# Patient Record
Sex: MALE | Race: BLACK OR AFRICAN AMERICAN | Employment: FULL TIME | URBAN - METROPOLITAN AREA
[De-identification: names, ages, dates, MRNs, and addresses within clinical notes are randomized per-mention and may not be internally consistent; named-entity substitution may affect disease eponyms.]

---

## 2019-08-13 ENCOUNTER — HOSPITAL ENCOUNTER (OUTPATIENT)
Dept: PHYSICAL THERAPY | Age: 54
Discharge: HOME | End: 2019-08-13
Payer: COMMERCIAL

## 2019-08-13 PROCEDURE — 97140 MANUAL THERAPY 1/> REGIONS: CPT | Performed by: PHYSICAL THERAPIST

## 2019-08-13 PROCEDURE — 97110 THERAPEUTIC EXERCISES: CPT | Performed by: PHYSICAL THERAPIST

## 2019-08-13 PROCEDURE — 97162 PT EVAL MOD COMPLEX 30 MIN: CPT | Performed by: PHYSICAL THERAPIST

## 2019-08-13 NOTE — THERAPY EVALUATION
[facility] : 1965 [address] @ [place] [address]. [phone]   [phone] OUTPATIENT PHYSICAL THERAPY:Initial Assessment 2019 ICD-10: Treatment Diagnosis: Radiculopathy, lumbar region (M54.16) Low back pain (M54.5) Difficulty in walking, not elsewhere classified (R26.2) Precautions/Allergies:  
Patient has no allergy information on record. no known Fall Risk Score:  
 Ambulatory/Rehab Services H2 Model Falls Risk Assessment Risk Factors: 
     (1)  Gender [Male] Ability to Rise from Chair: 
     (0)  Ability to rise in a single movement Falls Prevention Plan: No modifications necessary Total: (5 or greater = High Risk): 2  
  [facility]. [facility] [place] Patent #[id number]. Federal Law prohibits the replication, distribution or use without written permission from [facility] [employer] MD Orders: Evaluate and Treat with aquatic therapy: chronic low back pain with right-sided sciatica; lumbar radiculopathy, spinal stenosis of lumbar region with neurogenic claudication MEDICAL/REFERRING DIAGNOSIS: 
Chronic bilateral low back pain with right-sided sciatica [M54.41, G89.29] Right lumbar radiculopathy [M54.16] Spinal stenosis of lumbar region with neurogenic claudication [M48.062] DATE OF ONSET: chronic with worsening over the past year REFERRING PHYSICIAN: McCarrell, Leverette Battiest* RETURN PHYSICIAN APPOINTMENT: 10/8/19 INITIAL ASSESSMENT:  Mr. Sangeetha Dill presents with chronic LBP with pain in R lumbar region and into sacral/perineal region with c/o intermittent burning and numbness. Pt also has history of numbness in L lower lateral leg. No red flags present with bowel/bladder function when pt was asked. Pt is very limited with standing and walking and has been out of work due to inability to tolerate walking into/out of work.  Pt also has difficulty sitting or sleeping prolonged periods. Pt is obese and has decreased trunk and hip rotation mobility with more limitation with R. Pt has antalgic gait and decreased hip strength. Pt will benefit from aquatic PT to decrease load on spine and increase flexibility and core/hip strength to improve functional mobility. PROBLEM LIST (Impacting functional limitations): 1. Decreased Strength 2. Decreased ADL/Functional Activities 3. Decreased Transfer Abilities 4. Decreased Ambulation Ability/Technique 5. Decreased Balance 6. Increased Pain 7. Decreased Activity Tolerance 8. Decreased Flexibility/Joint Mobility 9. Decreased [place] with Home Exercise Program INTERVENTIONS PLANNED: 
1. Balance Exercise 2. Cold 3. Electrical Stimulation 4. Gait Training 5. Heat 6. Home Exercise Program (HEP) 7. Manual Therapy 8. Neuromuscular Re-education/Strengthening 9. Range of Motion (ROM) 10. Therapeutic Activites 11. Therapeutic Exercise/Strengthening 12. Transcutaneous Electrical Nerve Stimulation (TENS) 13. aquatics TREATMENT PLAN: 
Effective Dates: 8/13/2019 TO 11/11/2019 (90 days). Frequency/Duration: 2-3 times a week for 90 Days GOALS: (Goals have been discussed and agreed upon with patient.) Short-Term Functional Goals: Time Frame: 3 weeks 1. Pt will report compliance with HEP. 2. Pt will be able to tolerate at least 40 minutes of aquatic exercises without increased pain. Pt will be able to report being increase hip rotation mobility at least 10 ° for increased ease with tasks such as donning shoes/socks. Discharge Goals: Time Frame: 8-12 weeks 1. Pt will be able to ambulate at least 200 yds without needing to sit down doubling his walking distance from evaluation. 2. Pt will be able to decrease Oswestry score 17/50 or less noting significant functional improvements.   
3. Pt will be able to increase gluteus medius strength 4+/5 or greater for increased tolerance with walking and standing. Rehabilitation Potential For Stated Goals: Good Regarding [facility]'s therapy, I certify that the treatment plan above will be carried out by a therapist or under their direction. Thank you for this referral, Yesy Reyes, PT Referring Physician Signature: Brook Montalvo              Date HISTORY:  
History of Present Injury/Illness (Reason for Referral): 
Pt has history of chronic LBP with recent exacerbation over the past year. Pt reports primary pain in lower R lumbar region but describes pain into buttocks/sacral area/perineal. Pt reports severe pain with walking and standing and about a max of 100 yd walking. 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 Pt's MRI also showed L3-4 bilaterally foraminal stenosis. Pt has had prior lumbar surgeries, first in 1995 and two in 2000 from herniations (and 2nd surgery in 2000 was from infection). Pt is being referred for aquatic PT and desires to regain strength and endurance in his legs to tolerate walking more. Pt does state he is begin referred to a neurosurgeon 8/30/19. Past Medical History/Comorbidities:  
Mr. Mayank Izaguirre  has no past medical history on file. Mr. Mayank Izaguirre  has no past surgical history on file. Lumbar surgery for herniation 1995, 2x in 2000; Achilles Repair March 2018 Social History/Living Environment:  
  lives in [place] home with spouse Prior Level of Function/Work/Activity: 
Working as [employer] at [employer] until July 2019 and reports being unable to work his job due to the walking Current Medications:  No current outpatient medications on file. see list provided in chart Date Last Reviewed:  8/13/2019 # of Personal Factors/Comorbidities that affect the Plan of Care: 1-2: MODERATE COMPLEXITY EXAMINATION:  
Observation/Orthostatic Postural Assessment: Obese; B genu varum; iliac crest slightly higher on LLE but externally rotates RLE Palpation:   
      Tight R gluteals; L paraspinals more tone than R and mild tenderness along iliac crest  
ROM:   
Trunk Flexion to ankles but used UE support to return to standing Trunk Extension only ~10 ° with discomfort Sidebending WFL but stiff end ranges Rotation limited ~50% bilaterally but no pain 
 
 
SLR >90 ° bilaterally with L more flexible than right Hip AROM flexion limited in sitting due to obesity but in supine WFL 
 IR L 15 °  R 7 ° ER L 45 °  R 30 ° Strength:   
 Date: 
8/13/19 Date: 
 Date: 
  
LE MMT Left Right Left Right Left Right Hip Flex (L1/L2) 4/5 4-/5 Hip Abd (glut med) 4/5 4/5 Hip Ext 5/5 5/5 Quad    ( L3/4) 5/5 5/5 Hamstring 5/5 5/5 Anterior Tib (L4/L5) 5/5 5/5 Gastroc (S1/2) bilaterally weak but history of Achilles repair  in 2018 EHL (L5) 5/5 5/5 Special Tests:  SLR (--), Hip Scour (-- for pain but limited mobility R hip), VAMSI (-) Neurological Screen: dimnished sensation light touch lateral L lower leg; intermittent burning and numbness sacral area Reflexes: 0/4 patellar and Achilles bilaterally Functional Mobility:  
      Gait/Ambulation:  Antalgic, wide base of support, decreased stance time RLE Transfers:  Independent Bed Mobility:  Independent; knows log rolling Balance: Not formally assessed Body Structures Involved: 1. Nerves 2. Bones 3. Joints 4. Muscles Body Functions Affected: 1. Sensory/Pain 2. Neuromusculoskeletal 
3. Movement Related Activities and Participation Affected: 1. General Tasks and Demands 2. Mobility 3. Domestic Life 4. Community, Social and [place] # of elements that affect the Plan of Care: 4+: HIGH COMPLEXITY CLINICAL PRESENTATION:  
Presentation: Evolving clinical presentation with changing clinical Pt is a 24 y/o male w/hx of gastritis in past and frequent episodes of n/v comes w/complain of abdominal pain, nausea, and vomiting that started overnight, no unusual foods pt has been admitted multiple times in past, no gi f/u as out pt states didnt know to f/u, not on any meds. .  Patient has been wretching and in distress in ER. pt denies any fever, chills, sob, cp palpitations, diarrrhea or consipation or etoh use. characteristics: MODERATE COMPLEXITY CLINICAL DECISION MAKING:  
Tool Used: Modified Oswestry Low Back Pain Questionnaire Score:  Initial: 27/50  Most Recent: X/50 (Date: -- ) Interpretation of Score: Each section is scored on a 0-5 scale, 5 representing the greatest disability. The scores of each section are added together for a total score of 50. Medical Necessity:  
· Patient is expected to demonstrate progress in strength and range of motion to increase tolerance for walking and standing. Reason for Services/Other Comments: 
· Patient continues to require modification of therapeutic interventions to increase complexity of exercises. Use of outcome tool(s) and clinical judgement create a POC that gives a: Questionable prediction of patient's progress: MODERATE COMPLEXITY Total Treatment Duration: PT Patient Time In/Time Out Time In: 1600 Time Out: 1700 Yesy Reyes, PT

## 2019-08-13 NOTE — PROGRESS NOTES
Brittanymarsha Isabela  : 1965  Primary: Enio Luna Rpn  Secondary:  [address] [facility] @ [address] [place] [address].  [phone]   [phone]      OUTPATIENT PHYSICAL THERAPY: Daily Treatment Note  2019      ICD-10: Treatment Diagnosis: Radiculopathy, lumbar region (M54.16)  Low back pain (M54.5)  Difficulty in walking, not elsewhere classified (R26.2)      _________________________________________________________________________    TREATMENT PLAN:  Effective Dates: 2019 TO 2019 (90 days). Frequency/Duration: 2-3 times a week for 90 Days     GOALS: (Goals have been discussed and agreed upon with patient.)  Short-Term Functional Goals: Time Frame: 3 weeks  1. Pt will report compliance with HEP. 2. Pt will be able to tolerate at least 40 minutes of aquatic exercises without increased pain. 3. Pt will be able to report being increase hip rotation mobility at least 10 ° for increased ease with tasks such as donning shoes/socks. Discharge Goals: Time Frame: 8-12 weeks  1. Pt will be able to ambulate at least 200 yds without needing to sit down doubling his walking distance from evaluation. 2. Pt will be able to decrease Oswestry score 17/50 or less noting significant functional improvements. 3. Pt will be able to increase gluteus medius strength 4+/5 or greater for increased tolerance with walking and standing. Pre-treatment Symptoms/Complaints:  primary pain in lower R lumbar region but describes pain into buttocks/sacral area/perineal with pulsating pain. Pt reports severe pain with walking and standing and about a max of 100 yd walking. Numbness lateral L lower leg and plantar surface; antalgic gait    Pain: Initial: 7/10 R lumbar  Post Session:  \"I feel about like usual. I am ok sitting, but by the time I get out to my car, the pain may be bad. \"   Medications Last Reviewed:  2019  Updated Objective Findings:  See evaluation note from today     TREATMENT:   Therapeutic Exercise: (SEE MINUTES BELOW):  Exercises per grid below to improve mobility, strength and balance. Required minimal visual, verbal and tactile cues to promote proper body alignment and promote proper body mechanics. Aquatic Therapy (SEE MINUTES BELOW): Aquatic treatment performed per flow grid for Decreased muscle strength, Decreased static/dynamic balance and reactive control, Decreased range of motion, Decreased activity endurance, Decompression and Low impact and reduced weight bearing activity. Cues provided for technique.     Manual Therapy (SEE MINUTES BELOW):  See below to decrease tightness and pain and improve mobility  Modalities (SEE MINUTES BELOW): see above to decrease pain and improve mobility; pt monitored and skin WNL       Date: 8/13/19  Eval       Modalities:         Instructed with use of ice at home in lumbar region                       Manual Therapy: 15 minutes       Soft tissue mobilization  gluteals, paraspinals Roller R gluteals; manual in B lumbar region                                               Aquatics Activities: next       GAIT (F/B/S/M)        SLR gait        Hamstring Curl gait         Rockettes        Toe/ Heel Raises        Squats        Hip 4 way        Lunges        Clamshells        Hip Circles        Deep well:  Bicycling        Deep well: Jumping jacks        Deep well: Scissoring        Hamstring stretch        Piriformis stretch                                Therapeutic Exercises: 20 minutes       Lower trunk rotations x15       Pelvic tilts x15       Single knee to chest 3 H 20        Diagonal knee to chest piriformis 2 H 20                       Therapeutic Activities:                                  HEP: Pt instructed with HEP issued handout with HEP during initial evaluation    [facility]  Treatment/Session Summary:    · Response to Treatment:  During HEP instruction, pt had increase in pain with lower trunk rotations and pt was instructed to decrease ROM. Pt also had pain with first reps of pelvic tilts; however, pt reports pain subsiding with increasing reps. Pt tolerated HEP without increased symptoms afterwards. Described benefits of aquatics and pt agreeable for next session. · Communication/Consultation:  None today  · Equipment provided today:  None today  · Recommendations/Intent for next treatment session: Next visit will focus on beginning aquatic therapy.     Total Treatment Billable Duration:  60 minutes  PT Patient Time In/Time Out  Time In: 1600  Time Out: 1700  Yesy Reyes, PT

## 2019-08-15 ENCOUNTER — HOSPITAL ENCOUNTER (OUTPATIENT)
Dept: PHYSICAL THERAPY | Age: 54
Discharge: HOME | End: 2019-08-15
Payer: COMMERCIAL

## 2019-08-15 PROCEDURE — 97113 AQUATIC THERAPY/EXERCISES: CPT

## 2019-08-15 NOTE — PROGRESS NOTES
Denisha Cooper  : 1965  Primary: Jasen Luna Rpn  Secondary:  Shilpi Gentile @ [address] [place] [address].  [phone]   [phone]      OUTPATIENT PHYSICAL THERAPY: Daily Treatment Note  8/15/2019      ICD-10: Treatment Diagnosis: Radiculopathy, lumbar region (M54.16)  Low back pain (M54.5)  Difficulty in walking, not elsewhere classified (R26.2)      _________________________________________________________________________    TREATMENT PLAN:  Effective Dates: 2019 TO 2019 (90 days). Frequency/Duration: 2-3 times a week for 90 Days     GOALS: (Goals have been discussed and agreed upon with patient.)  Short-Term Functional Goals: Time Frame: 3 weeks  1. Pt will report compliance with HEP. 2. Pt will be able to tolerate at least 40 minutes of aquatic exercises without increased pain. 3. Pt will be able to report being increase hip rotation mobility at least 10 ° for increased ease with tasks such as donning shoes/socks. Discharge Goals: Time Frame: 8-12 weeks  1. Pt will be able to ambulate at least 200 yds without needing to sit down doubling his walking distance from evaluation. 2. Pt will be able to decrease Oswestry score 17/50 or less noting significant functional improvements. 3. Pt will be able to increase gluteus medius strength 4+/5 or greater for increased tolerance with walking and standing. Pre-treatment Symptoms/Complaints: Pt states \"I have a burning pain when I walk. My feet feel hot. \"  Pain: Initial: 3/10 buttocks Post Session: 4-5/10 buttocks and B LE   Medications Last Reviewed:  8/15/2019  Updated Objective Findings:       TREATMENT:   Therapeutic Exercise: (SEE MINUTES BELOW):  Exercises per grid below to improve mobility, strength and balance. Required minimal visual, verbal and tactile cues to promote proper body alignment and promote proper body mechanics.     Aquatic Therapy (SEE MINUTES BELOW): Aquatic treatment performed per flow grid for Decreased muscle strength, Decreased static/dynamic balance and reactive control, Decreased range of motion, Decreased activity endurance, Decompression and Low impact and reduced weight bearing activity. Cues provided for technique. Manual Therapy (SEE MINUTES BELOW):  See below to decrease tightness and pain and improve mobility  Modalities (SEE MINUTES BELOW): see above to decrease pain and improve mobility; pt monitored and skin WNL       Date of service 8/13/19  Eval 8/15/19 (visit 2)      modalities         Instructed with use of ice at home in lumbar region                       Manual therapy 15 minutes        Roller R gluteals; manual in B lumbar region                                               Aquatic exercise next 2.5#, 55 min      Gait forward/back/lateral  x4 lengths      DF/PF  x30 each      squats  3x10      Hip 3 way  x15 ea BLE                              Deep well traction  Attempted deep well but pt reported increased pain              bridges  x10 on mat      Hamstring stretch  30 sec hold x 4 B      Piriformis stretch  30 sec hold x 4 B      Gastro stretch on step  1 min hold x 2      Forward flexion stretch  Seated 10 sec hold x 5      Scapula retraction  3x10 seated       20 minutes        x15        x15        3 H 20         2 H 20                                                         HEP: Pt instructed with HEP issued handout with HEP during initial evaluation    [employer] Portal  Treatment/Session Summary:    · Response to Treatment:  Pt reported increased pain down the B LEs that he describes as burning. Pt was unable to relax during the deep well activities and he reported increased pain. Continue POC. · Communication/Consultation:  None today  · Equipment provided today:  None today  · Recommendations/Intent for next treatment session: Next visit will focus on beginning aquatic therapy.     Total Treatment Billable Duration: 55 minutes  PT Patient Time In/Time Out  Time In: 0400  Time Out: [facility] [place]

## 2019-08-20 ENCOUNTER — HOSPITAL ENCOUNTER (OUTPATIENT)
Dept: PHYSICAL THERAPY | Age: 54
Discharge: HOME | End: 2019-08-20
Payer: COMMERCIAL

## 2019-08-20 PROCEDURE — 97113 AQUATIC THERAPY/EXERCISES: CPT

## 2019-08-20 PROCEDURE — 97014 ELECTRIC STIMULATION THERAPY: CPT

## 2019-08-20 NOTE — PROGRESS NOTES
Rush Baird  : 1965  Primary: Silvia Gomez Cheryl Rpn  Secondary:  Beth Max Sink  [address] [place] [address].  [phone]   [phone]      OUTPATIENT PHYSICAL THERAPY: Daily Treatment Note  2019      ICD-10: Treatment Diagnosis: Radiculopathy, lumbar region (M54.16)  Low back pain (M54.5)  Difficulty in walking, not elsewhere classified (R26.2)      _________________________________________________________________________    TREATMENT PLAN:  Effective Dates: 2019 TO 2019 (90 days). Frequency/Duration: 2-3 times a week for 90 Days     GOALS: (Goals have been discussed and agreed upon with patient.)  Short-Term Functional Goals: Time Frame: 3 weeks  1. Pt will report compliance with HEP. 2. Pt will be able to tolerate at least 40 minutes of aquatic exercises without increased pain. 3. Pt will be able to report being increase hip rotation mobility at least 10 ° for increased ease with tasks such as donning shoes/socks. Discharge Goals: Time Frame: 8-12 weeks  1. Pt will be able to ambulate at least 200 yds without needing to sit down doubling his walking distance from evaluation. 2. Pt will be able to decrease Oswestry score 17/50 or less noting significant functional improvements. 3. Pt will be able to increase gluteus medius strength 4+/5 or greater for increased tolerance with walking and standing. Pre-treatment Symptoms/Complaints: Pt states \"I had pain walking across the parking lot. \"  Pain: Initial: 5/10 lumbar spine and buttock Post Session: 4-5/10 buttocks and B LE   Medications Last Reviewed:  2019  Updated Objective Findings:       TREATMENT:   Therapeutic Exercise: (SEE MINUTES BELOW):  Exercises per grid below to improve mobility, strength and balance. Required minimal visual, verbal and tactile cues to promote proper body alignment and promote proper body mechanics.     Aquatic Therapy (SEE MINUTES BELOW): Aquatic treatment performed per flow grid for Decreased muscle strength, Decreased static/dynamic balance and reactive control, Decreased range of motion, Decreased activity endurance, Decompression and Low impact and reduced weight bearing activity. Cues provided for technique. Manual Therapy (SEE MINUTES BELOW):  See below to decrease tightness and pain and improve mobility  Modalities (SEE MINUTES BELOW): see above to decrease pain and improve mobility; pt monitored and skin WNL       Date of service 8/13/19  Eval 8/15/19 (visit 2) 8/20/19 (visit 3)     modalities   15 min      Instructed with use of ice at home in lumbar region  interferential with moist hot pack in side lying post tx, to lumbar spine                     Manual therapy 15 minutes        Roller R gluteals; manual in B lumbar region                                               Aquatic exercise next 2.5#, 55 min 2.5#, 40 min     Gait forward/back/lateral  x4 lengths x4 lengths each     DF/PF  x30 each      squats  3x10      Hip 3 way  x15 ea BLE x15 ea B LE                             Deep well traction  Attempted deep well but pt reported increased pain 5 min with pain (with noodle and aqua belt)     Deep well bicycle   x2 min     bridges  x10 on mat      Hamstring stretch  30 sec hold x 4 B      Piriformis stretch  30 sec hold x 4 B 30 sec hold x4 B     Gastro stretch on step  1 min hold x 2      Forward flexion stretch  Seated 10 sec hold x 5      Scapula retraction  3x10 seated       20 minutes        x15        x15        3 H 20         2 H 20                                                         HEP: Pt instructed with HEP issued handout with HEP during initial evaluation    [employer] Portal  Treatment/Session Summary:    · Response to Treatment:  Pt reported increased burning pain in the B feet and L foot numbness during activity. He reported increased pain with the deep well.  Pt ambulated with increase guarding and decreased speed in the 4 foot section. Continue POC. · Communication/Consultation:  None today  · Equipment provided today:  None today  · Recommendations/Intent for next treatment session: Next visit will focus on beginning aquatic therapy.     Total Treatment Billable Duration: 55 minutes (Pt required extra time to change into dry clothes)  PT Patient Time In/Time Out  Time In: 0400  Time Out: Rissa 44, PTA

## 2019-08-22 ENCOUNTER — HOSPITAL ENCOUNTER (OUTPATIENT)
Dept: PHYSICAL THERAPY | Age: 54
Discharge: HOME | End: 2019-08-22
Payer: COMMERCIAL

## 2019-08-27 ENCOUNTER — HOSPITAL ENCOUNTER (OUTPATIENT)
Dept: PHYSICAL THERAPY | Age: 54
Discharge: HOME | End: 2019-08-27
Payer: COMMERCIAL

## 2019-08-27 PROCEDURE — 97140 MANUAL THERAPY 1/> REGIONS: CPT

## 2019-08-27 PROCEDURE — 97113 AQUATIC THERAPY/EXERCISES: CPT

## 2019-08-27 NOTE — PROGRESS NOTES
Dio Worthington  : 1965  Primary: Jesse Jurado Cheryl Rpn  Secondary:  Jordan Paige [place] [address].  [phone]   [phone]      OUTPATIENT PHYSICAL THERAPY: Daily Treatment Note  2019      ICD-10: Treatment Diagnosis: Radiculopathy, lumbar region (M54.16)  Low back pain (M54.5)  Difficulty in walking, not elsewhere classified (R26.2)      _________________________________________________________________________    TREATMENT PLAN:  Effective Dates: 2019 TO 2019 (90 days). Frequency/Duration: 2-3 times a week for 90 Days     GOALS: (Goals have been discussed and agreed upon with patient.)  Short-Term Functional Goals: Time Frame: 3 weeks  1. Pt will report compliance with HEP. 2. Pt will be able to tolerate at least 40 minutes of aquatic exercises without increased pain. 3. Pt will be able to report being increase hip rotation mobility at least 10 ° for increased ease with tasks such as donning shoes/socks. Discharge Goals: Time Frame: 8-12 weeks  1. Pt will be able to ambulate at least 200 yds without needing to sit down doubling his walking distance from evaluation. 2. Pt will be able to decrease Oswestry score 17/50 or less noting significant functional improvements. 3. Pt will be able to increase gluteus medius strength 4+/5 or greater for increased tolerance with walking and standing. Pre-treatment Symptoms/Complaints: Pt states \"The pain is the same. \"  Pain: Initial: 7/10 lumbar spine and buttock Post Session: 5-6/10 buttocks and B LE   Medications Last Reviewed:  2019  Updated Objective Findings:       TREATMENT:   Therapeutic Exercise: (SEE MINUTES BELOW):  Exercises per grid below to improve mobility, strength and balance. Required minimal visual, verbal and tactile cues to promote proper body alignment and promote proper body mechanics.     Aquatic Therapy (SEE MINUTES BELOW): Aquatic treatment performed per flow grid for Decreased muscle strength, Decreased static/dynamic balance and reactive control, Decreased range of motion, Decreased activity endurance, Decompression and Low impact and reduced weight bearing activity. Cues provided for technique.     Manual Therapy (SEE MINUTES BELOW):  See below to decrease tightness and pain and improve mobility  Modalities (SEE MINUTES BELOW): see above to decrease pain and improve mobility; pt monitored and skin WNL       Date of service 8/13/19  Eval 8/15/19 (visit 2) 8/20/19 (visit 3) 8/27/19 (visit 4)    modalities   15 min      Instructed with use of ice at home in lumbar region  interferential with moist hot pack in side lying post tx, to lumbar spine                     Manual therapy 15 minutes   15 min     Roller R gluteals; manual in B lumbar region   STM to lumbar spine and R piriformis in sidelying (roller was used last 5 min)                                            Aquatic exercise next 2.5#, 55 min 2.5#, 40 min 2.5# 30 min    Gait forward/back/lateral  x4 lengths x4 lengths each x4 L each (no marches)    DF/PF  x30 each      squats  3x10      Hip 3 way  x15 ea BLE x15 ea B LE                     Deep well hip abduction    3 min with belt and 1 noodle    Deep well traction  Attempted deep well but pt reported increased pain 5 min with pain (with noodle and aqua belt) 5 min    Deep well bicycle   x2 min x3 min    bridges  x10 on mat      Hamstring stretch  30 sec hold x 4 B      Piriformis stretch  30 sec hold x 4 B 30 sec hold x4 B     Gastro stretch on step  1 min hold x 2      Forward flexion stretch  Seated 10 sec hold x 5      Scapula retraction  3x10 seated       20 minutes        x15        x15        3 H 20         2 H 20                                                         HEP: Pt instructed with HEP issued handout with HEP during initial evaluation    [employer]  Treatment/Session Summary:    · Response to Treatment:  Pt reported increased pain and numbness in the B foot. Pt reported more numbness in the L foot which also extended up to the lateral LE. Pt required a walker to exit the pool due to increased foot numbness. Pt sat down and rested for 2-3 minutes and sensation returned where pt felt comfortable walking independently to dressing room. Pt's sensation was then as presented during eval. Continue POC but hold deep well next visit as pt did not respond well upon exiting the pool. Pt is seeing neurosurgeon next week. · Communication/Consultation:  None today  · Equipment provided today:  None today  · Recommendations/Intent for next treatment session: Next visit will focus on continuing aquatic therapy.     Total Treatment Billable Duration: 45 minutes (Pt required extra time to change into dry clothes)  PT Patient Time In/Time Out  Time In: 0400  Time Out: Rissa 44, PTA

## 2019-08-29 ENCOUNTER — HOSPITAL ENCOUNTER (OUTPATIENT)
Dept: PHYSICAL THERAPY | Age: 54
Discharge: HOME | End: 2019-08-29
Payer: COMMERCIAL

## 2019-08-29 PROCEDURE — 97140 MANUAL THERAPY 1/> REGIONS: CPT | Performed by: PHYSICAL THERAPIST

## 2019-08-29 PROCEDURE — 97014 ELECTRIC STIMULATION THERAPY: CPT | Performed by: PHYSICAL THERAPIST

## 2019-08-29 PROCEDURE — 97113 AQUATIC THERAPY/EXERCISES: CPT | Performed by: PHYSICAL THERAPIST

## 2019-08-29 NOTE — PROGRESS NOTES
Amanda Chavis  : 1965  Primary: August Luna Rpn  Secondary:  Greciajackieyury May [facility]  [address] [place] [address].  [phone]   [phone]      OUTPATIENT PHYSICAL THERAPY: Daily Treatment Note  2019      ICD-10: Treatment Diagnosis: Radiculopathy, lumbar region (M54.16)  Low back pain (M54.5)  Difficulty in walking, not elsewhere classified (R26.2)      _________________________________________________________________________    TREATMENT PLAN:  Effective Dates: 2019 TO 2019 (90 days). Frequency/Duration: 2-3 times a week for 90 Days     GOALS: (Goals have been discussed and agreed upon with patient.)  Short-Term Functional Goals: Time Frame: 3 weeks  1. Pt will report compliance with HEP. 2. Pt will be able to tolerate at least 40 minutes of aquatic exercises without increased pain. 3. Pt will be able to report being increase hip rotation mobility at least 10 ° for increased ease with tasks such as donning shoes/socks. Discharge Goals: Time Frame: 8-12 weeks  1. Pt will be able to ambulate at least 200 yds without needing to sit down doubling his walking distance from evaluation. 2. Pt will be able to decrease Oswestry score 17/50 or less noting significant functional improvements. 3. Pt will be able to increase gluteus medius strength 4+/5 or greater for increased tolerance with walking and standing. Pre-treatment Symptoms/Complaints: Pt states \"The pain is the same when I'm up and walking. \"  Pain: Initial: 3/10 lumbar spine and buttock (more on R lumbar and buttock area) --pt was in L sidelying when asked pain levels, but pain increases to mod to severe with standing and walking; burning in back, and sacrum area Post Session: 6/10 buttocks and B LE    Medications Last Reviewed:  2019  Updated Objective Findings:       TREATMENT:   Therapeutic Exercise: (SEE MINUTES BELOW):  Exercises per grid below to improve mobility, strength and balance. Required minimal visual, verbal and tactile cues to promote proper body alignment and promote proper body mechanics. Aquatic Therapy (SEE MINUTES BELOW): Aquatic treatment performed per flow grid for Decreased muscle strength, Decreased static/dynamic balance and reactive control, Decreased range of motion, Decreased activity endurance, Decompression and Low impact and reduced weight bearing activity. Cues provided for technique.     Manual Therapy (SEE MINUTES BELOW):  See below to decrease tightness and pain and improve mobility  Modalities (SEE MINUTES BELOW): see above to decrease pain and improve mobility; pt monitored and skin WNL       Date of service 8/13/19  Eval 8/15/19 (visit 2) 8/20/19 (visit 3) 8/27/19 (visit 4) 8/29/19  (visit 5)   modalities   15 min  15 minutes    Instructed with use of ice at home in lumbar region  interferential with moist hot pack in side lying post tx, to lumbar spine  Pre-mod B lumbosacral area with ice with pt in   10 minutes but increased time for positioning                    Manual therapy 15 minutes   15 min 10 minutes    Roller R gluteals; manual in B lumbar region   STM to lumbar spine and R piriformis in sidelying (roller was used last 5 min) Roller massage and manual to R lumbar/glutes/piriformis region                                           Aquatic exercise next 2.5#, 55 min 2.5#, 40 min 2.5# 30 min 2.5#/ 40 min   Gait forward/back/lateral  x4 lengths x4 lengths each x4 L each (no marches) x4L F/B/S   DF/PF  x30 each      squats  3x10   x15 mini-squats   Hip 3 way  x15 ea BLE x15 ea B LE     Hip circles     x10 each   Rockettes     x10 BLE (nerve glide in standing)   Core: partial tandem with UE flex/ext     Paddles open 2x10 (increased pain)           Deep well hip abduction    3 min with belt and 1 noodle    Deep well traction  Attempted deep well but pt reported increased pain 5 min with pain (with noodle and aqua belt) 5 min Deep well bicycle   x2 min x3 min    bridges  x10 on mat      Hamstring stretch  30 sec hold x 4 B   3 H 30 BLE with back against wall and noodle under ankle   Piriformis stretch  30 sec hold x 4 B 30 sec hold x4 B  5 H 30   2 H 30    Gastro stretch on step  1 min hold x 2      Forward flexion stretch  Seated 10 sec hold x 5   Standing holding to rail R/L and front 3 H 20 each   Scapula retraction  3x10 seated       20 minutes        x15        x15        3 H 20         2 H 20                                                         HEP: Pt instructed with HEP issued handout with HEP during initial evaluation    [employer]  Treatment/Session Summary:    · Response to Treatment:  Pt reports no significant changes with pain and sees neurosurgeon 9/6/19. Sensation was assessed and pt only had diminished sensation lateral lower L leg and was intact B feet. Began with STM R lumbar, hip region because that was chief c/o pain. However, STM seemed to increase pain and \"heat up the area,\" per pt. Pt felt tingling in buttocks. Held further [facility] and initiated aquatic therapy. Pt then was having L foot numbness increase after 10 minutes of gait activities ~75% offloaded. Instructed pt to stop and stretch and performed seated piriromis stretch on pool steps. Pt reports decreasing numbness in L foot. Pt then performed other stretches and exercises stopping intermittently to stretch into piriformis stretch as pt describes symptoms as \"heating up\" in L hamstring area or shooting in genital area. Pt reports pain 6/10 after light aquatics and no change with sensation afterwards in his feet despite c/o numbness in L foot with gait activities. Used ice/e-stim afterwards and pt was in L sidelying. After 10 minutes, pt had to sit up because he felt L foot (plantar) numbness and pain in posterior L thigh.  Upon change of position, pt's sensation returned to normal.   · Communication/Consultation:  None today  · Equipment provided today:  None today  · Recommendations/Intent for next treatment session: Next visit will focus on continuing aquatic therapy increasing tolerance for gait and mobility.     Total Treatment Billable Duration: 65 minutes  PT Patient Time In/Time Out  Time In: 1545  Time Out: 1700  Yesy Reyes, PT

## 2019-09-03 ENCOUNTER — HOSPITAL ENCOUNTER (OUTPATIENT)
Dept: PHYSICAL THERAPY | Age: 54
Discharge: HOME | End: 2019-09-03
Payer: COMMERCIAL

## 2019-09-03 PROCEDURE — 97113 AQUATIC THERAPY/EXERCISES: CPT

## 2019-09-03 PROCEDURE — 97140 MANUAL THERAPY 1/> REGIONS: CPT

## 2019-09-03 PROCEDURE — 97014 ELECTRIC STIMULATION THERAPY: CPT

## 2019-09-03 NOTE — PROGRESS NOTES
Gavi Marshall  : 1965  Primary: Roma Luna Rpn  Secondary:  Colleen Orozco [address].  [phone]   [phone]      OUTPATIENT PHYSICAL THERAPY: Daily Treatment Note AND Progress Note  9/3/2019      ICD-10: Treatment Diagnosis: Radiculopathy, lumbar region (M54.16)  Low back pain (M54.5)  Difficulty in walking, not elsewhere classified (R26.2)      _________________________________________________________________________    TREATMENT PLAN:  Effective Dates: 2019 TO 2019 (90 days). Frequency/Duration: 2-3 times a week for 90 Days     GOALS: (Goals have been discussed and agreed upon with patient.)  Short-Term Functional Goals: Time Frame: 3 weeks  1. Pt will report compliance with HEP. (met)  2. Pt will be able to tolerate at least 40 minutes of aquatic exercises without increased pain. (NOT MET)  3. Pt will be able to report being increase hip rotation mobility at least 10 ° for increased ease with tasks such as donning shoes/socks. (NOT MET)  Discharge Goals: Time Frame: 8-12 weeks  1. Pt will be able to ambulate at least 200 yds without needing to sit down doubling his walking distance from evaluation. (NOT MET)  2. Pt will be able to decrease Oswestry score 17/50 or less noting significant functional improvements. (NOT MET)  3. Pt will be able to increase gluteus medius strength 4+/5 or greater for increased tolerance with walking and standing. (NOT MET)    Pre-treatment Symptoms/Complaints: Pt states \"The pain is the same. \"  Pain: Initial: 510 lumbar spine and buttock     Pt continues to report intermittent numbness in the L foot.  Post Session: 5-6/10 buttocks and B LE  AND increased numbness in the B feet   Medications Last Reviewed:  9/3/2019  Updated Objective Findings:   19    Modified Oswestry 29/50  Gluteus medius MMT 4/ B     TREATMENT:   Therapeutic Exercise: (SEE MINUTES BELOW): Exercises per grid below to improve mobility, strength and balance. Required minimal visual, verbal and tactile cues to promote proper body alignment and promote proper body mechanics. Aquatic Therapy (SEE MINUTES BELOW): Aquatic treatment performed per flow grid for Decreased muscle strength, Decreased static/dynamic balance and reactive control, Decreased range of motion, Decreased activity endurance, Decompression and Low impact and reduced weight bearing activity. Cues provided for technique.     Manual Therapy (SEE MINUTES BELOW):  See below to decrease tightness and pain and improve mobility  Modalities (SEE MINUTES BELOW): see above to decrease pain and improve mobility; pt monitored and skin WNL       Date of service 8/13/19  Eval 8/15/19 (visit 2) 8/20/19 (visit 3) 8/27/19 (visit 4) 8/29/19  (visit 5) 9/03/19 (visit 6) PN   modalities   15 min  15 minutes 15 min    Instructed with use of ice at home in lumbar region  interferential with moist hot pack in side lying post tx, to lumbar spine  Pre-mod B lumbosacral area with ice with pt in   10 minutes but increased time for positioning  IFC in sidelying with ice pack                     Manual therapy 15 minutes   15 min 10 minutes 15 min     Roller R gluteals; manual in B lumbar region   STM to lumbar spine and R piriformis in sidelying (roller was used last 5 min) Roller massage and manual to R lumbar/glutes/piriformis region Roller massage and manual massage, 15 min in side lying                                                Aquatic exercise next 2.5#, 55 min 2.5#, 40 min 2.5# 30 min 2.5#/ 40 min No #, 30 min   Gait forward/back/lateral  x4 lengths x4 lengths each x4 L each (no marches) x4L F/B/S Forward and lateral in 4 ft and then backwards and marching in 3.5 ft, 4 lengths ea   DF/PF  x30 each       squats  3x10   x15 mini-squats    Hip 3 way  x15 ea BLE x15 ea B LE      Hip circles     x10 each    Rockettes     x10 BLE (nerve glide in standing) Core: partial tandem with UE flex/ext     Paddles open 2x10 (increased pain)             Deep well hip abduction    3 min with belt and 1 noodle     Deep well traction  Attempted deep well but pt reported increased pain 5 min with pain (with noodle and aqua belt) 5 min     Deep well bicycle   x2 min x3 min     bridges  x10 on mat       Hamstring stretch  30 sec hold x 4 B   3 H 30 BLE with back against wall and noodle under ankle 30 sec hold x 4 B on pool step   Piriformis stretch  30 sec hold x 4 B 30 sec hold x4 B  5 H 30   2 H 30  30 sec hold x 6 B   Gastro stretch on step  1 min hold x 2       Forward flexion stretch  Seated 10 sec hold x 5   Standing holding to rail R/L and front 3 H 20 each Lunges on step with rails x20 B   Scapula retraction  3x10 seated        20 minutes         x15         x15         3 H 20          2 H 20                                                                HEP: Pt instructed with HEP issued handout with HEP during initial evaluation    [employer]  Treatment/Session Summary:    · Response to Treatment:  Pt reports increased L foot numbness with aquatic exercise in ~75% buoyancy  but he reports mild relief with piriformis stretching. Pt will see the neurologist on Friday for a consult and he will be put on hold due to increased symptoms with light aquatics. · Communication/Consultation:  None today  · Equipment provided today:  None today  · Recommendations/Intent for next treatment session: Next visit will focus on continuing aquatic therapy increasing tolerance for gait and mobility.      Total Treatment Billable Duration: 60 minutes  PT Patient Time In/Time Out  Time In: 0300  Time Out: 0405  LOGAN Moya, PT

## 2019-09-05 ENCOUNTER — APPOINTMENT (OUTPATIENT)
Dept: PHYSICAL THERAPY | Age: 54
End: 2019-09-05
Payer: COMMERCIAL

## 2019-09-10 ENCOUNTER — HOSPITAL ENCOUNTER (OUTPATIENT)
Dept: PHYSICAL THERAPY | Age: 54
Discharge: HOME | End: 2019-09-10
Payer: COMMERCIAL

## 2019-09-10 NOTE — THERAPY DISCHARGE
Jose Miguel Rizzo  : 1965  Primary: Mariia Luna Rpn  Secondary:  [facility]  [address] [place] [address]  [phone]   [phone]      OUTPATIENT PHYSICAL THERAPY:Discontinuation Summary:  9/10/2019      ICD-10: Treatment Diagnosis: Radiculopathy, lumbar region (M54.16)  Low back pain (M54.5)  Difficulty in walking, not elsewhere classified (R26.2)      _________________________________________________________________________  Discontinuation Summary: Pt was seen for 6 visits and did not make any significant progress. He could not tolerate light aquatic exercises. Pt reports increased L foot numbness with aquatic exercise in ~75% buoyancy  but he reports mild relief with piriformis stretching. Pt had been referred to neurosurgeon and after appointment with neurosurgeon, pt called and cancelled all further appointments per MD.     TREATMENT PLAN:  Effective Dates: 2019 TO 2019 (90 days). Frequency/Duration: 2-3 times a week for 90 Days     GOALS: (Goals have been discussed and agreed upon with patient.)  Short-Term Functional Goals: Time Frame: 3 weeks  1. Pt will report compliance with HEP. (met)  2. Pt will be able to tolerate at least 40 minutes of aquatic exercises without increased pain. (NOT MET)  3. Pt will be able to report being increase hip rotation mobility at least 10 ° for increased ease with tasks such as donning shoes/socks. (NOT MET)  Discharge Goals: Time Frame: 8-12 weeks  1. Pt will be able to ambulate at least 200 yds without needing to sit down doubling his walking distance from evaluation. (NOT MET)  2. Pt will be able to decrease Oswestry score 17/50 or less noting significant functional improvements. (NOT MET)  3. Pt will be able to increase gluteus medius strength 4+/5 or greater for increased tolerance with walking and standing.  (NOT MET)    Pre-treatment Symptoms/Complaints: Pt states \"The pain is the same. \"  Pain: Initial: 5/10 lumbar spine and buttock     Pt continues to report intermittent numbness in the L foot. Post Session: 5-6/10 buttocks and B LE  AND increased numbness in the B feet   Medications Last Reviewed:  9/10/2019  Updated Objective Findings:   9/03/19    Modified Oswestry 29/50  Gluteus medius MMT 4/5 B     TREATMENT:   Therapeutic Exercise: (SEE MINUTES BELOW):  Exercises per grid below to improve mobility, strength and balance. Required minimal visual, verbal and tactile cues to promote proper body alignment and promote proper body mechanics. Aquatic Therapy (SEE MINUTES BELOW): Aquatic treatment performed per flow grid for Decreased muscle strength, Decreased static/dynamic balance and reactive control, Decreased range of motion, Decreased activity endurance, Decompression and Low impact and reduced weight bearing activity. Cues provided for technique.     Manual Therapy (SEE MINUTES BELOW):  See below to decrease tightness and pain and improve mobility  Modalities (SEE MINUTES BELOW): see above to decrease pain and improve mobility; pt monitored and skin WNL       Date of service 8/13/19  Eval 8/15/19 (visit 2) 8/20/19 (visit 3) 8/27/19 (visit 4) 8/29/19  (visit 5) 9/03/19 (visit 6) PN   modalities   15 min  15 minutes 15 min    Instructed with use of ice at home in lumbar region  interferential with moist hot pack in side lying post tx, to lumbar spine  Pre-mod B lumbosacral area with ice with pt in   10 minutes but increased time for positioning  IFC in sidelying with ice pack                     Manual therapy 15 minutes   15 min 10 minutes 15 min     Roller R gluteals; manual in B lumbar region   STM to lumbar spine and R piriformis in sidelying (roller was used last 5 min) Roller massage and manual to R lumbar/glutes/piriformis region Roller massage and manual massage, 15 min in side lying                                                Aquatic exercise next 2.5#, 55 min 2.5#, 40 min 2.5# 30 min 2.5#/ 40 min No #, 30 min   Gait forward/back/lateral  x4 lengths x4 lengths each x4 L each (no marches) x4L F/B/S Forward and lateral in 4 ft and then backwards and marching in 3.5 ft, 4 lengths ea   DF/PF  x30 each       squats  3x10   x15 mini-squats    Hip 3 way  x15 ea BLE x15 ea B LE      Hip circles     x10 each    Rockettes     x10 BLE (nerve glide in standing)    Core: partial tandem with UE flex/ext     Paddles open 2x10 (increased pain)             Deep well hip abduction    3 min with belt and 1 noodle     Deep well traction  Attempted deep well but pt reported increased pain 5 min with pain (with noodle and aqua belt) 5 min     Deep well bicycle   x2 min x3 min     bridges  x10 on mat       Hamstring stretch  30 sec hold x 4 B   3 H 30 BLE with back against wall and noodle under ankle 30 sec hold x 4 B on pool step   Piriformis stretch  30 sec hold x 4 B 30 sec hold x4 B  5 H 30   2 H 30  30 sec hold x 6 B   Gastro stretch on step  1 min hold x 2       Forward flexion stretch  Seated 10 sec hold x 5   Standing holding to rail R/L and front 3 H 20 each Lunges on step with rails x20 B   Scapula retraction  3x10 seated        20 minutes         x15         x15         3 H 20          2 H 20                                                                HEP: Pt instructed with HEP issued handout with HEP during initial evaluation    [employer]  Treatment/Session Summary:    · Response to Treatment:  As of last visit 9/3/19: Pt reports increased L foot numbness with aquatic exercise in ~75% buoyancy  but he reports mild relief with piriformis stretching. Pt will see the neurologist on Friday for a consult and he will be put on hold due to increased symptoms with light aquatics. · Communication/Consultation:  None today  · Equipment provided today:  None today  · Recommendations/Intent for next treatment session: Discontinue due to pt stating MD desires him to cancel PT appointments. Yesy Reyes, PT

## 2019-09-12 ENCOUNTER — APPOINTMENT (OUTPATIENT)
Dept: PHYSICAL THERAPY | Age: 54
End: 2019-09-12
Payer: COMMERCIAL

## 2019-09-16 ENCOUNTER — APPOINTMENT (OUTPATIENT)
Dept: PHYSICAL THERAPY | Age: 54
End: 2019-09-16
Payer: COMMERCIAL

## 2019-09-20 ENCOUNTER — APPOINTMENT (OUTPATIENT)
Dept: PHYSICAL THERAPY | Age: 54
End: 2019-09-20
Payer: COMMERCIAL

## 2019-09-24 ENCOUNTER — APPOINTMENT (OUTPATIENT)
Dept: PHYSICAL THERAPY | Age: 54
End: 2019-09-24
Payer: COMMERCIAL

## 2019-09-26 ENCOUNTER — APPOINTMENT (OUTPATIENT)
Dept: PHYSICAL THERAPY | Age: 54
End: 2019-09-26
Payer: COMMERCIAL